# Patient Record
Sex: MALE | Race: WHITE | HISPANIC OR LATINO | Employment: PART TIME | URBAN - METROPOLITAN AREA
[De-identification: names, ages, dates, MRNs, and addresses within clinical notes are randomized per-mention and may not be internally consistent; named-entity substitution may affect disease eponyms.]

---

## 2023-08-31 ENCOUNTER — HOSPITAL ENCOUNTER (EMERGENCY)
Facility: CLINIC | Age: 33
Discharge: HOME | End: 2023-08-31
Attending: EMERGENCY MEDICINE | Admitting: EMERGENCY MEDICINE
Payer: MEDICAID

## 2023-08-31 VITALS
OXYGEN SATURATION: 100 % | TEMPERATURE: 98 F | HEIGHT: 66 IN | HEART RATE: 114 BPM | RESPIRATION RATE: 18 BRPM | BODY MASS INDEX: 22.5 KG/M2 | DIASTOLIC BLOOD PRESSURE: 118 MMHG | SYSTOLIC BLOOD PRESSURE: 147 MMHG | WEIGHT: 140 LBS

## 2023-08-31 DIAGNOSIS — F15.10 METHAMPHETAMINE USE (H): ICD-10-CM

## 2023-08-31 PROCEDURE — 99282 EMERGENCY DEPT VISIT SF MDM: CPT

## 2023-08-31 ASSESSMENT — ACTIVITIES OF DAILY LIVING (ADL)
ADLS_ACUITY_SCORE: 35
ADLS_ACUITY_SCORE: 35

## 2023-08-31 NOTE — ED PROVIDER NOTES
"  History     Chief Complaint:  Addiction Problem (meth)       The history is provided by the patient.      Facundo Frederick is a 32 year old male who presents with an addiction problem. Patient reports he is staying at a sober house, and relapsed, using meth yesterday around 1300. His sober  told him to present to the ED, otherwise he would get kicked out. Endorses body aches. He adds he feels dehydrated, but has been trying to drink water. Patient does admit to smoking cigarettes. Denies fever, chills, chest pain, suicidal ideations, or homicidal ideations. Patient notes he is from [place] and this is his first time in treatment so he is unfamiliar with the process.     Independent Historian:   None, patient only.    Review of External Notes:   NA     Medications:    Bictegrav/emtricit/tenofov   NFDB Marijuana     Past Medical History:    HIV   Drug dependence    Physical Exam   Patient Vitals for the past 24 hrs:   BP Temp Temp src Pulse Resp SpO2 Height Weight   08/31/23 1700 (!) 147/118 -- -- 114 18 100 % -- --   08/31/23 1431 (!) 150/112 -- -- 120 20 100 % -- --   08/31/23 1237 (!) 159/123 98  F (36.7  C) Oral (!) 127 16 99 % 1.676 m (5' 6\") 63.5 kg (140 lb)      Physical Exam  General: Resting on the bed.  Head: No obvious trauma to head.  Ears, Nose, Throat:  External ears normal.  Nose normal.  No pharyngeal erythema, swelling or exudate.  Midline uvula. Moist mucus membranes.  Eyes:  Conjunctivae clear.   Neck: Normal range of motion.  Neck supple.   CV: Tachycardic and regular rhythm.  No murmurs.      Respiratory: Effort normal and breath sounds normal. No wheezing or crackles.   Gastrointestinal: No distension.   Musculoskeletal: Normal range of motion.  Non tender extremities to palpations.    Neuro: Alert. Moving all extremities appropriately. Pressured speech.    Skin: Skin is warm and dry.  No rash noted.   Psych: Normal mood and affect. No SI or HI.    Emergency Department Course "   Laboratory:  Labs Ordered and Resulted from Time of ED Arrival to Time of ED Departure - No data to display     Emergency Department Course & Assessments:  Assessments/Consultations/Discussion of Management or Tests:   ED Course as of 08/31/23 1719   Thu Aug 31, 2023   1605 Dr. White's evaluation.   1655 Patient called sober house and they stated they can take him back.      Social Determinants of Health affecting care:   Homelessness/Housing Insecurity and substance use    Disposition:  The patient was discharged to home.     Impression & Plan    CMS Diagnoses: None    Medical Decision Making:  Patient appears well on exam.  He has no complaints.  He is hypertensive and tachycardic, consistent with methamphetamine use.  He denies chest pain, shortness of breath, fever/chills.  No SI or HI.  He states he was not kicked out of his sober house, but was told to come here for detox.  It is explained to him that we do not do meth detox in the emergency department.  There is a bed available at a detox facility, however the patient speaks with the manager at the sober house, and they state that he is cleared to return there if he is medically cleared here.  He has no medical reason to be admitted or stay in the emergency department.  There is no need for him to go to meth detox, and he can return to the sober house.  He remains in stable condition and is discharged back to his sober house.  Return precautions are given and he verbalizes understanding.    Critical Care time:  was 0 minutes for this patient excluding procedures.    Diagnosis:    ICD-10-CM    1. Methamphetamine use (H)  F15.10          Scribe Disclosure:  Nini BERNAL, am serving as a scribe at 4:01 PM on 8/31/2023 to document services personally performed by John White MD based on my observations and the provider's statements to me.    8/31/2023   John White MD Peery, Stephen, MD  08/31/23 2005

## 2023-08-31 NOTE — DISCHARGE INSTRUCTIONS
Please refrain from any methamphetamine use.  Please return to the emergency department if you have any new or concerning symptoms.

## 2023-08-31 NOTE — ED TRIAGE NOTES
Pt he used meth yesterday at 1pm  Pt stays at a sober house - for meth recovery  Pt denies suicide   Pt contracts for safety